# Patient Record
Sex: MALE | Race: WHITE | URBAN - METROPOLITAN AREA
[De-identification: names, ages, dates, MRNs, and addresses within clinical notes are randomized per-mention and may not be internally consistent; named-entity substitution may affect disease eponyms.]

---

## 2022-11-08 ENCOUNTER — OFFICE (OUTPATIENT)
Dept: URBAN - METROPOLITAN AREA CLINIC 8 | Facility: CLINIC | Age: 87
Setting detail: OPHTHALMOLOGY
End: 2022-11-08
Payer: MEDICARE

## 2022-11-08 DIAGNOSIS — Z96.1: ICD-10-CM

## 2022-11-08 DIAGNOSIS — H40.013: ICD-10-CM

## 2022-11-08 DIAGNOSIS — H02.834: ICD-10-CM

## 2022-11-08 DIAGNOSIS — H02.831: ICD-10-CM

## 2022-11-08 DIAGNOSIS — H26.491: ICD-10-CM

## 2022-11-08 PROCEDURE — 92133 CPTRZD OPH DX IMG PST SGM ON: CPT | Performed by: OPHTHALMOLOGY

## 2022-11-08 PROCEDURE — 99213 OFFICE O/P EST LOW 20 MIN: CPT | Performed by: OPHTHALMOLOGY

## 2022-11-08 ASSESSMENT — REFRACTION_CURRENTRX
OS_OVR_VA: 20/
OD_AXIS: 108
OS_AXIS: 77
OD_CYLINDER: -2.00
OS_VPRISM_DIRECTION: SV
OD_VPRISM_DIRECTION: SV
OD_SPHERE: -3.00
OD_OVR_VA: 20/
OS_CYLINDER: -3.00
OS_SPHERE: -1.50

## 2022-11-08 ASSESSMENT — KERATOMETRY
OS_K1POWER_DIOPTERS: 43.50
OS_K2POWER_DIOPTERS: 47.50
OD_K2POWER_DIOPTERS: 48.75
OS_AXISANGLE_DEGREES: 07
OD_AXISANGLE_DEGREES: 16
METHOD_AUTO_MANUAL: AUTO
OD_K1POWER_DIOPTERS: 44.50

## 2022-11-08 ASSESSMENT — REFRACTION_MANIFEST
OD_VA2: 20/20(J1+)
OD_SPHERE: -3.00
OS_SPHERE: -1.50
OU_VA: 20/25
OD_ADD: +2.50
OD_CYLINDER: -2.75
OS_VA2: 20/20(J1+)
OS_AXIS: 105
OS_CYLINDER: -2.75
OS_VA1: 20/30
OD_AXIS: 105
OS_ADD: +2.50
OD_VA1: 20/25

## 2022-11-08 ASSESSMENT — LID POSITION - DERMATOCHALASIS
OD_DERMATOCHALASIS: RUL 2+
OS_DERMATOCHALASIS: LUL 2+

## 2022-11-08 ASSESSMENT — AXIALLENGTH_DERIVED
OS_AL: 23.9838
OD_AL: 24.162
OD_AL: 24.162

## 2022-11-08 ASSESSMENT — CONFRONTATIONAL VISUAL FIELD TEST (CVF)
OD_FINDINGS: FULL
OS_FINDINGS: FULL

## 2022-11-08 ASSESSMENT — REFRACTION_AUTOREFRACTION
OD_AXIS: 105
OS_AXIS: 105
OS_CYLINDER: -5.75
OD_CYLINDER: -6.25
OD_SPHERE: -1.25
OS_SPHERE: PLANO

## 2022-11-08 ASSESSMENT — SPHEQUIV_DERIVED
OD_SPHEQUIV: -4.375
OS_SPHEQUIV: -2.875
OD_SPHEQUIV: -4.375

## 2022-11-08 ASSESSMENT — VISUAL ACUITY
OD_BCVA: 20/25
OS_BCVA: 20/25-2

## 2022-12-14 PROBLEM — Z00.00 ENCOUNTER FOR PREVENTIVE HEALTH EXAMINATION: Status: ACTIVE | Noted: 2022-12-14

## 2022-12-15 ENCOUNTER — APPOINTMENT (OUTPATIENT)
Dept: ORTHOPEDIC SURGERY | Facility: CLINIC | Age: 87
End: 2022-12-15

## 2022-12-15 DIAGNOSIS — Z78.9 OTHER SPECIFIED HEALTH STATUS: ICD-10-CM

## 2022-12-15 DIAGNOSIS — M84.353A STRESS FRACTURE, UNSPECIFIED FEMUR, INITIAL ENCOUNTER FOR FRACTURE: ICD-10-CM

## 2022-12-15 PROCEDURE — 99204 OFFICE O/P NEW MOD 45 MIN: CPT

## 2022-12-15 NOTE — PHYSICAL EXAM
[Left] : left knee [NL (0)] : extension 0 degrees [5___] : hamstring 5[unfilled]/5 [Equivocal] : equivocal Marifer [] : no anterior pain with flexion [[id number]] : flexion 120 degrees

## 2022-12-15 NOTE — HISTORY OF PRESENT ILLNESS
[de-identified] : C/o LT knee pain. States no injury woke up one morning in pain. Presents with MRI report. Saw Dr. Boykin who referred to Ortho for meniscal tear. Patient is taking Advil as needed.

## 2023-01-12 ENCOUNTER — FORM ENCOUNTER (OUTPATIENT)
Age: 88
End: 2023-01-12

## 2023-01-19 ENCOUNTER — FORM ENCOUNTER (OUTPATIENT)
Age: 88
End: 2023-01-19

## 2023-01-19 ENCOUNTER — APPOINTMENT (OUTPATIENT)
Dept: ORTHOPEDIC SURGERY | Facility: CLINIC | Age: 88
End: 2023-01-19

## 2023-01-30 ENCOUNTER — APPOINTMENT (OUTPATIENT)
Dept: ORTHOPEDIC SURGERY | Facility: CLINIC | Age: 88
End: 2023-01-30
Payer: MEDICARE

## 2023-01-30 DIAGNOSIS — M17.12 UNILATERAL PRIMARY OSTEOARTHRITIS, LEFT KNEE: ICD-10-CM

## 2023-01-30 PROCEDURE — 99213 OFFICE O/P EST LOW 20 MIN: CPT

## 2023-01-30 NOTE — PHYSICAL EXAM
[Left] : left knee [NL (0)] : extension 0 degrees [5___] : hamstring 5[unfilled]/5 [Equivocal] : equivocal Marifer [] : not mildly antalgic [[email or web address]] : flexion 120 degrees

## 2023-06-06 ENCOUNTER — CONSULTATION/EVALUATION (OUTPATIENT)
Facility: LOCATION | Age: 88
End: 2023-06-06

## 2023-06-06 DIAGNOSIS — Z96.1: ICD-10-CM

## 2023-06-06 DIAGNOSIS — H26.493: ICD-10-CM

## 2023-06-06 PROCEDURE — 99204 OFFICE O/P NEW MOD 45 MIN: CPT

## 2023-06-06 PROCEDURE — 66821 AFTER CATARACT LASER SURGERY: CPT

## 2023-06-06 ASSESSMENT — VISUAL ACUITY
OU_CC: 20/30
OU_SC: J1+
OS_BAT: 20/60
OD_CC: 20/40
OS_CC: 20/30
OD_SC: J1
OS_SC: J1+
OS_SC: 20/70
OD_BAT: 20/60
OU_SC: 20/70
OD_SC: 20/80

## 2023-06-06 ASSESSMENT — TONOMETRY
OD_IOP_MMHG: 16
OS_IOP_MMHG: 16